# Patient Record
Sex: FEMALE | Race: BLACK OR AFRICAN AMERICAN
[De-identification: names, ages, dates, MRNs, and addresses within clinical notes are randomized per-mention and may not be internally consistent; named-entity substitution may affect disease eponyms.]

---

## 2020-08-20 ENCOUNTER — HOSPITAL ENCOUNTER (OUTPATIENT)
Dept: HOSPITAL 61 - RAD | Age: 50
Discharge: HOME | End: 2020-08-20
Attending: FAMILY MEDICINE
Payer: COMMERCIAL

## 2020-08-20 DIAGNOSIS — M17.12: ICD-10-CM

## 2020-08-20 DIAGNOSIS — M25.462: ICD-10-CM

## 2020-08-20 DIAGNOSIS — M19.071: Primary | ICD-10-CM

## 2020-08-20 PROCEDURE — 73620 X-RAY EXAM OF FOOT: CPT

## 2020-08-20 PROCEDURE — 73560 X-RAY EXAM OF KNEE 1 OR 2: CPT

## 2020-08-20 NOTE — RAD
EXAM: Right foot 2 views.

 

HISTORY: Right foot pain.

 

COMPARISON: None.

 

FINDINGS: Two views of the right foot are obtained.

 

A lucent defect along the first proximal phalangeal head suggests a 

subacute nonunified intra-articular fracture. Overall alignment is 

preserved.

 

There are foci of cortical thickening along the third metatarsal diaphysis

medially.

 

There is mild pes planus. An ossicle along the dorsum of the talar neck is

likely an accessory ossicle. 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 

There is a small plantar calcaneal spur.

 

IMPRESSION:

1. Findings consistent with a nonunified fracture of the first proximal 

phalangeal head. This appears subacute to chronic. Correlate with other 

data.

2. Small foci of cortical thickening along the third metatarsal diaphysis 

suggest stress lesions of uncertain acuity. MRI could further assess for 

active stress lesions if there is persistent concern.

3. Calcaneal cuboid osteoarthritis.

 

Electronically signed by: ROBIN Kerns MD (8/20/2020 5:20 PM) 

[id number]

## 2020-08-20 NOTE — RAD
EXAM: KNEE LEFT 2V.

 

HISTORY: Left knee pain.

 

COMPARISON: None.

 

FINDINGS: There is fragmentation of the medial femoral condylar articular 

surface. Multiple fragments measure up to 9 mm. The weightbearing portion 

appears most involved on the lateral projection.

 

There is moderate medial compartmental joint space narrowing and 

osteophytosis with mild varus angulation. The lateral compartmental joint 

space is relatively preserved. There are moderate osteophytes along the 

lateral and patellofemoral compartments. There is a large joint effusion.

 

IMPRESSION: 

1. 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 

collapse of the left medial femoral condyle. Ongoing management is 

recommended.

2. Moderate to severe medial compartmental osteoarthritis with varus 

angulation. Large joint effusion.

 

Electronically signed by: ROBIN Kerns MD (8/20/2020 5:17 PM) 

[id number]

## 2021-09-09 ENCOUNTER — HOSPITAL ENCOUNTER (EMERGENCY)
Dept: HOSPITAL 61 - ER | Age: 51
Discharge: HOME | End: 2021-09-09
Payer: SELF-PAY

## 2021-09-09 VITALS — DIASTOLIC BLOOD PRESSURE: 87 MMHG | SYSTOLIC BLOOD PRESSURE: 144 MMHG

## 2021-09-09 VITALS — WEIGHT: 220.46 LBS | BODY MASS INDEX: 34.6 KG/M2 | HEIGHT: 67 IN

## 2021-09-09 DIAGNOSIS — G47.00: ICD-10-CM

## 2021-09-09 DIAGNOSIS — R11.2: ICD-10-CM

## 2021-09-09 DIAGNOSIS — S60.221A: Primary | ICD-10-CM
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
## 2021-09-09 DIAGNOSIS — Z20.822: ICD-10-CM
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
## 2021-09-09 DIAGNOSIS — N39.0: ICD-10-CM

## 2021-09-09 LAB
ALBUMIN SERPL-MCNC: 3.1 G/DL (ref 3.4–5)
ALBUMIN/GLOB SERPL: 0.6 {RATIO} (ref 1–1.7)
ALP SERPL-CCNC: 118 U/L (ref 46–116)
ALT SERPL-CCNC: 18 U/L (ref 14–59)
AMPHETAMINE/METHAMPHETAMINE: (no result)
ANION GAP SERPL CALC-SCNC: 9 MMOL/L (ref 6–14)
APTT PPP: (no result) S
AST SERPL-CCNC: 12 U/L (ref 15–37)
BACTERIA #/AREA URNS HPF: (no result) /HPF
BARBITURATES UR-MCNC: (no result) UG/ML
BASOPHILS # BLD AUTO: 0.1 X10^3/UL (ref 0–0.2)
BASOPHILS NFR BLD: 1 % (ref 0–3)
BENZODIAZ UR-MCNC: (no result) UG/L
BILIRUB SERPL-MCNC: 0.7 MG/DL (ref 0.2–1)
BILIRUB UR QL STRIP: NEGATIVE
BUN SERPL-MCNC: 9 MG/DL (ref 7–20)
BUN/CREAT SERPL: 11 (ref 6–20)
CALCIUM SERPL-MCNC: 9.7 MG/DL (ref 8.5–10.1)
CANNABINOIDS UR-MCNC: (no result) UG/L
CHLORIDE SERPL-SCNC: 105 MMOL/L (ref 98–107)
CO2 SERPL-SCNC: 28 MMOL/L (ref 21–32)
COCAINE UR-MCNC: (no result) NG/ML
CREAT SERPL-MCNC: 0.8 MG/DL (ref 0.6–1)
EOSINOPHIL NFR BLD: 0.1 X10^3/UL (ref 0–0.7)
EOSINOPHIL NFR BLD: 1 % (ref 0–3)
ERYTHROCYTE [DISTWIDTH] IN BLOOD BY AUTOMATED COUNT: 13.9 % (ref 11.5–14.5)
FIBRINOGEN PPP-MCNC: (no result) MG/DL
GFR SERPLBLD BASED ON 1.73 SQ M-ARVRAT: 91.1 ML/MIN
GLUCOSE SERPL-MCNC: 97 MG/DL (ref 70–99)
HCT VFR BLD CALC: 40.7 % (ref 36–47)
HGB BLD-MCNC: 13.9 G/DL (ref 12–15.5)
LIPASE: 46 U/L (ref 73–393)
LYMPHOCYTES # BLD: 2.2 X10^3/UL (ref 1–4.8)
LYMPHOCYTES NFR BLD AUTO: 21 % (ref 24–48)
MAGNESIUM SERPL-MCNC: 2.1 MG/DL (ref 1.8–2.4)
MCH RBC QN AUTO: 31 PG (ref 25–35)
MCHC RBC AUTO-ENTMCNC: 34 G/DL (ref 31–37)
MCV RBC AUTO: 91 FL (ref 79–100)
METHADONE SERPL-MCNC: (no result) NG/ML
MONO #: 0.7 X10^3/UL (ref 0–1.1)
MONOCYTES NFR BLD: 7 % (ref 0–9)
NEUT #: 7.6 X10^3/UL (ref 1.8–7.7)
NEUTROPHILS NFR BLD AUTO: 71 % (ref 31–73)
NITRITE UR QL STRIP: NEGATIVE
OPIATES UR-MCNC: (no result) NG/ML
PCP SERPL-MCNC: (no result) MG/DL
PH UR STRIP: 5.5 [PH]
PLATELET # BLD AUTO: 338 X10^3/UL (ref 140–400)
POTASSIUM SERPL-SCNC: 3.4 MMOL/L (ref 3.5–5.1)
PROT SERPL-MCNC: 8.2 G/DL (ref 6.4–8.2)
PROT UR STRIP-MCNC: NEGATIVE MG/DL
RBC # BLD AUTO: 4.46 X10^6/UL (ref 3.5–5.4)
RBC #/AREA URNS HPF: 0 /HPF (ref 0–2)
SODIUM SERPL-SCNC: 142 MMOL/L (ref 136–145)
UROBILINOGEN UR-MCNC: 1 MG/DL
WBC # BLD AUTO: 10.6 X10^3/UL (ref 4–11)
WBC #/AREA URNS HPF: (no result) /HPF (ref 0–4)

## 2021-09-09 PROCEDURE — 96361 HYDRATE IV INFUSION ADD-ON: CPT

## 2021-09-09 PROCEDURE — 87426 SARSCOV CORONAVIRUS AG IA: CPT

## 2021-09-09 PROCEDURE — 80053 COMPREHEN METABOLIC PANEL: CPT

## 2021-09-09 PROCEDURE — 36415 COLL VENOUS BLD VENIPUNCTURE: CPT

## 2021-09-09 PROCEDURE — 87086 URINE CULTURE/COLONY COUNT: CPT

## 2021-09-09 PROCEDURE — 83690 ASSAY OF LIPASE: CPT

## 2021-09-09 PROCEDURE — 83735 ASSAY OF MAGNESIUM: CPT

## 2021-09-09 PROCEDURE — 85025 COMPLETE CBC W/AUTO DIFF WBC: CPT

## 2021-09-09 PROCEDURE — U0003 INFECTIOUS AGENT DETECTION BY NUCLEIC ACID (DNA OR RNA); SEVERE ACUTE RESPIRATORY SYNDROME CORONAVIRUS 2 (SARS-COV-2) (CORONAVIRUS DISEASE [COVID-19]), AMPLIFIED PROBE TECHNIQUE, MAKING USE OF HIGH THROUGHPUT TECHNOLOGIES AS DESCRIBED BY CMS-2020-01-R: HCPCS

## 2021-09-09 PROCEDURE — 96374 THER/PROPH/DIAG INJ IV PUSH: CPT

## 2021-09-09 PROCEDURE — 80307 DRUG TEST PRSMV CHEM ANLYZR: CPT

## 2021-09-09 PROCEDURE — 99285 EMERGENCY DEPT VISIT HI MDM: CPT

## 2021-09-09 PROCEDURE — 81001 URINALYSIS AUTO W/SCOPE: CPT
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
